# Patient Record
Sex: MALE | Race: WHITE
[De-identification: names, ages, dates, MRNs, and addresses within clinical notes are randomized per-mention and may not be internally consistent; named-entity substitution may affect disease eponyms.]

---

## 2019-06-04 ENCOUNTER — HOSPITAL ENCOUNTER (INPATIENT)
Dept: HOSPITAL 96 - M.ERS | Age: 69
LOS: 3 days | Discharge: HOME | DRG: 444 | End: 2019-06-07
Attending: FAMILY MEDICINE | Admitting: FAMILY MEDICINE
Payer: MEDICARE

## 2019-06-04 VITALS — HEIGHT: 67.99 IN | WEIGHT: 192 LBS | BODY MASS INDEX: 29.1 KG/M2

## 2019-06-04 VITALS — SYSTOLIC BLOOD PRESSURE: 160 MMHG | DIASTOLIC BLOOD PRESSURE: 68 MMHG

## 2019-06-04 DIAGNOSIS — N17.0: ICD-10-CM

## 2019-06-04 DIAGNOSIS — I13.0: ICD-10-CM

## 2019-06-04 DIAGNOSIS — Z98.49: ICD-10-CM

## 2019-06-04 DIAGNOSIS — K80.00: Primary | ICD-10-CM

## 2019-06-04 DIAGNOSIS — Z86.711: ICD-10-CM

## 2019-06-04 DIAGNOSIS — E78.5: ICD-10-CM

## 2019-06-04 DIAGNOSIS — E86.9: ICD-10-CM

## 2019-06-04 DIAGNOSIS — N40.0: ICD-10-CM

## 2019-06-04 DIAGNOSIS — Z82.49: ICD-10-CM

## 2019-06-04 DIAGNOSIS — Z95.1: ICD-10-CM

## 2019-06-04 DIAGNOSIS — Z79.899: ICD-10-CM

## 2019-06-04 DIAGNOSIS — Z95.2: ICD-10-CM

## 2019-06-04 DIAGNOSIS — Z90.5: ICD-10-CM

## 2019-06-04 DIAGNOSIS — I48.91: ICD-10-CM

## 2019-06-04 DIAGNOSIS — Z79.82: ICD-10-CM

## 2019-06-04 DIAGNOSIS — N18.4: ICD-10-CM

## 2019-06-04 DIAGNOSIS — R65.10: ICD-10-CM

## 2019-06-04 DIAGNOSIS — I25.10: ICD-10-CM

## 2019-06-04 DIAGNOSIS — I34.0: ICD-10-CM

## 2019-06-04 DIAGNOSIS — Z79.01: ICD-10-CM

## 2019-06-04 DIAGNOSIS — Z87.891: ICD-10-CM

## 2019-06-04 LAB
ABSOLUTE BASOPHILS: 0 THOU/UL (ref 0–0.2)
ABSOLUTE EOSINOPHILS: 0.1 THOU/UL (ref 0–0.7)
ABSOLUTE MONOCYTES: 0.8 THOU/UL (ref 0–1.2)
ALBUMIN SERPL-MCNC: 3.4 G/DL (ref 3.4–5)
ALP SERPL-CCNC: 57 U/L (ref 46–116)
ALT SERPL-CCNC: 41 U/L (ref 30–65)
ANION GAP SERPL CALC-SCNC: 8 MMOL/L (ref 7–16)
AST SERPL-CCNC: 47 U/L (ref 15–37)
BASOPHILS NFR BLD AUTO: 0.3 %
BILIRUB SERPL-MCNC: 1.4 MG/DL
BUN SERPL-MCNC: 51 MG/DL (ref 7–18)
CALCIUM SERPL-MCNC: 8.7 MG/DL (ref 8.5–10.1)
CHLORIDE SERPL-SCNC: 107 MMOL/L (ref 98–107)
CO2 SERPL-SCNC: 27 MMOL/L (ref 21–32)
CREAT SERPL-MCNC: 2.6 MG/DL (ref 0.6–1.3)
EOSINOPHIL NFR BLD: 1 %
GLUCOSE SERPL-MCNC: 121 MG/DL (ref 70–99)
GRANULOCYTES NFR BLD MANUAL: 83.1 %
HCT VFR BLD CALC: 38.6 % (ref 42–52)
HGB BLD-MCNC: 12.7 GM/DL (ref 14–18)
INR PPP: 3
LIPASE: 155 U/L (ref 73–393)
LYMPHOCYTES # BLD: 1.2 THOU/UL (ref 0.8–5.3)
LYMPHOCYTES NFR BLD AUTO: 9.3 %
MCH RBC QN AUTO: 31 PG (ref 26–34)
MCHC RBC AUTO-ENTMCNC: 33 G/DL (ref 28–37)
MCV RBC: 93.8 FL (ref 80–100)
MONOCYTES NFR BLD: 6.3 %
MPV: 9.8 FL. (ref 7.2–11.1)
NEUTROPHILS # BLD: 11.2 THOU/UL (ref 1.6–8.1)
NT-PRO BRAIN NAT PEPTIDE: 4260 PG/ML (ref ?–300)
NUCLEATED RBCS: 0 /100WBC
PLATELET COUNT*: 193 THOU/UL (ref 150–400)
POTASSIUM SERPL-SCNC: 4.4 MMOL/L (ref 3.5–5.1)
PROT SERPL-MCNC: 7 G/DL (ref 6.4–8.2)
PROTHROMBIN TIME: 30.1 SECONDS (ref 9.2–11.5)
RBC # BLD AUTO: 4.11 MIL/UL (ref 4.5–6)
RDW-CV: 16.2 % (ref 10.5–14.5)
SODIUM SERPL-SCNC: 142 MMOL/L (ref 136–145)
TROPONIN-I LEVEL: <0.06 NG/ML (ref ?–0.06)
WBC # BLD AUTO: 13.4 THOU/UL (ref 4–11)

## 2019-06-04 NOTE — CON
[address]  [address]                    CONSULTATION                  
_______________________________________________________________________________
 
Name:       LIN GARCIA            Room:           [address] [place]  
..#:  [id number]      Account #:      [id number]  
Admission:  06/05/19     Attend Phys:    Sameer Buenrostro, 
Discharge:               Date of Birth:  [birth year]  
         Report #: [id number]
                                                                     [id number]  
_______________________________________________________________________________
THIS REPORT FOR:  //name//                      
 
CC: Nikita Buenrostro
 
DATE OF SERVICE:  06/06/2019
 
 
NEPHROLOGY CONSULTATION:
 
CONSULTING PHYSICIAN:  Dr. Catherine.
 
REASON FOR NEPHROLOGY CONSULTATION:  Acute kidney injury on top of chronic
kidney disease stage 4.
 
REASON FOR ADMISSION:  Abdominal pain and nausea and vomiting.
 
HISTORY OF PRESENT ILLNESS:  This is a 69-year-old male with past medical
history of chronic kidney disease stage 4 with baseline creatinine around 2.3,
history of left nephrectomy in 01/2018 because of bleeding and history of
partial right nephrectomy and this was in 2010 because of renal cell carcinoma
and other history including coronary artery bypass 1 vessel bypass in 2016 with
a mitral valve replacement, mechanical valve replacement and other medical
histories came in because of abdominal pain and nausea and vomiting.  His
creatinine was found to be 2.6 and he was given IV fluids with which his
creatinine has come down to 2.3.  His symptoms have also improved this morning. 
There was initial suspicion of acute cholecystitis, but only gallbladder stones
were found on imaging and there was no evidence of cholecystitis and since then
the symptoms have improved.  There is no surgery planned.  He does not take any
NSAIDs at home.  No history of any kidney stones.
 
ALLERGIES:  No known allergies.
 
REVIEW OF SYSTEMS:  As mentioned in history of present illness, otherwise
negative.
 
PAST MEDICAL HISTORY:  Which include as mentioned in history of present illness.
 Otherwise, shoulder surgery, knee surgery, nose surgery, cataract extraction,
removal of tumor from the right kidney and chronic kidney disease stage 4,
hypertension, hyperlipidemia.
 
PAST SURGICAL HISTORY:  As above.
 
MEDICATIONS:  Include aspirin, escitalopram, Lipitor, warfarin, oxycodone,
amlodipine, carvedilol.
 
 
 
 
[place] [address]                    CONSULTATION                  
_______________________________________________________________________________
 
Name:       LIN GARCIA            Room:           [address] [place]  
[facility].#:  [id number]      Account #:      [id number]  
Admission:  06/05/19     Attend Phys:    Sameer Buenrostro, 
Discharge:               Date of Birth:  [birth year]  
         Report #: [id number]
                                                                     [id number]  
_______________________________________________________________________________
FAMILY HISTORY:  Positive for heart disease.
 
SOCIAL HISTORY:  He is , lives with his wife.  Does not smoke or take
alcohol or use illicit drugs.
 
PHYSICAL EXAMINATION:
VITAL SIGNS:  Blood pressure is 129/70, pulse rate is 81, temperature is 36.9,
respiratory rate is 16 and pulse ox is 98%.  He is on room air.
GENERAL:  He is awake and alert and oriented x 3.
HEAD AND EYES:  Mucous membranes are moist.
NECK:  There is no JVD.
CHEST:  Bilaterally clear to auscultation.  Anteriorly, no crackles or wheezing.
CARDIOVASCULAR:  S1, S2 normal.  No murmurs.
ABDOMEN:  Soft, nontender, nondistended, bowel sounds present.
EXTREMITIES:  There is no lower extremity edema, symmetrical lower extremities.
NEUROLOGICAL:  Gross neurological function is intact.
PSYCHIATRIC:  Mood and affect seem to be normal.
 
LABORATORY DATA:  Hemoglobin 11.6.  WBC is 12.  INR was 3.9, creatinine was 2.3,
down from 2.6.  Other labs are reviewed.
 
IMAGING:  Abdominal ultrasound, chest x-ray and abdominal pelvic CT were
reviewed.
 
ASSESSMENT:
1.  Acute kidney injury, chronic kidney disease stage 4 in the setting of volume
depletion, creatinine was 2.6 at baseline and UA showed 2+ protein, 0-2 rbc's
per high power field and creatinine is 2.3 at baseline.
2.  History of left nephrectomy because of bleeding.  This was in January of
last year and history of right partial nephrectomy.  This was in 2010.
3.  The patient follows with Urology.  He does have a right complex cyst that he
knows of, imaging at our facility here also showed a right complex cyst.
4.  Abdominal pain, nausea, vomiting, gallbladder stones, symptoms have
improved.
5.  Supratherapeutic INR, primary is managing.
6.  History of coronary artery disease and coronary artery bypass.
7.  He is on anticoagulation for history of Factor 5 Leiden as well as for a
mechanical mitral valve.
 
PLAN:
1.  Creatinine has come back to baseline 2.3.  The patient can follow up with
his outpatient nephrologist.
2.  The patient can follow with his urologist for the complex cyst in the right
kidney, which he is already doing.
 
Thank you for this consultation.  I do not have any further recommendations.  I
 
 
 
[place] [address]                    CONSULTATION                  
_______________________________________________________________________________
 
Name:       LIN GARCIA            Room:           [address] [place]  
[facility].#:  [id number]      Account #:      [id number]  
Admission:  06/05/19     Attend Phys:    Sameer Buenrostro, 
Discharge:               Date of Birth:  [birth year]  
         Report #: [id number]
                                                                     [id number]  
_______________________________________________________________________________
will be signing off.  It is okay to be discharged from renal standpoint today. 
Discussed with the family, the patient, and the patient's nurse.
 
 
 
 
 
 
 
 
 
 
 
 
 
 
 
 
 
 
 
 
 
 
 
 
 
 
 
 
 
 
 
 
 
 
 
 
 
 
 
 
 
 
                       
                                        By:                                
                 
D: 06/06/19 1023_______________________________________
T: 06/07/19 0127Ajordan Mendez MD               /nt

## 2019-06-05 VITALS — DIASTOLIC BLOOD PRESSURE: 58 MMHG | SYSTOLIC BLOOD PRESSURE: 115 MMHG

## 2019-06-05 VITALS — DIASTOLIC BLOOD PRESSURE: 70 MMHG | SYSTOLIC BLOOD PRESSURE: 133 MMHG

## 2019-06-05 VITALS — DIASTOLIC BLOOD PRESSURE: 72 MMHG | SYSTOLIC BLOOD PRESSURE: 118 MMHG

## 2019-06-05 LAB
ANION GAP SERPL CALC-SCNC: 8 MMOL/L (ref 7–16)
BILIRUB UR-MCNC: (no result) MG/DL
BUN SERPL-MCNC: 45 MG/DL (ref 7–18)
CALCIUM SERPL-MCNC: 8.6 MG/DL (ref 8.5–10.1)
CHLORIDE SERPL-SCNC: 107 MMOL/L (ref 98–107)
CO2 SERPL-SCNC: 27 MMOL/L (ref 21–32)
COLOR UR: YELLOW
CREAT SERPL-MCNC: 2.3 MG/DL (ref 0.6–1.3)
GLUCOSE SERPL-MCNC: 143 MG/DL (ref 70–99)
HYALINE CASTS #/AREA URNS LPF: (no result) /LPF
KETONES UR STRIP-MCNC: NEGATIVE MG/DL
MAGNESIUM SERPL-MCNC: 2 MG/DL (ref 1.8–2.4)
POTASSIUM SERPL-SCNC: 4.3 MMOL/L (ref 3.5–5.1)
PROT UR QL STRIP: (no result)
RBC # UR STRIP: (no result) /UL
RBC #/AREA URNS HPF: (no result) /HPF (ref 0–2)
SODIUM SERPL-SCNC: 142 MMOL/L (ref 136–145)
SP GR UR STRIP: 1.02 (ref 1–1.03)
SQUAMOUS: (no result) /LPF (ref 0–3)
URINE CLARITY: CLEAR
URINE GLUCOSE-RANDOM: NEGATIVE
URINE LEUKOCYTES-REFLEX: NEGATIVE
URINE NITRITE-REFLEX: NEGATIVE
UROBILINOGEN UR STRIP-ACNC: 0.2 E.U./DL (ref 0.2–1)

## 2019-06-05 NOTE — EKG
[place] [address]
Phone:  [phone]                     ELECTROCARDIOGRAM REPORT      
_______________________________________________________________________________
 
Name:       LIN GARCIA            Room:           [place] [address]   ADM [place]  
[facility]#:  [id number]      Account #:      [id number]  
Admission:  19     Attend Phys:    Sameer Buenrostro, 
Discharge:               Date of Birth:  [birth year]  
         Report #: [id number]
    [id number]
_______________________________________________________________________________
THIS REPORT FOR:  //name//                      
 
                         [facility] ED
                                       
Test Date:    2019               Test Time:    21:36:28
Pat Name:     LIN GARCIA        Department:   
Patient ID:   [id number]            Room:         [facility]
Gender:       M                        Technician:   KS
:          [id number]               Requested By: Marcy Catherine
Order Number: [id number] MD:   Tyler Zamora
                                 Measurements
Intervals                              Axis          
Rate:         74                       P:            -19
MI:           151                      QRS:          -20
QRSD:         106                      T:            82
QT:           419                                    
QTc:          465                                    
                           Interpretive Statements
Sinus rhythm
Borderline left axis deviation
septal infarct, old
Nonspecific repol abnormality, lateral leads
Compared to ECG 10/03/2016 12:28:57
 
Myocardial infarct finding still present
 
Electronically Signed On 2019 10:24:23 CDT by Tylre Zamora
[email or web address]
 
 
 
 
 
 
 
 
 
 
 
 
 
 
 
  <ELECTRONICALLY SIGNED>
                                           By: Tyler Zamora MD, FACC      
  19     1024
D: 19 2136   _____________________________________
T: 196   Tyler Zamora MD, [facility]        /EPI

## 2019-06-05 NOTE — NUR
er admit to  201
telephone report given prior to arrival
patient to  via cart
oriented to rm and call light
denies pain at this time

## 2019-06-06 VITALS — SYSTOLIC BLOOD PRESSURE: 130 MMHG | DIASTOLIC BLOOD PRESSURE: 68 MMHG

## 2019-06-06 VITALS — DIASTOLIC BLOOD PRESSURE: 80 MMHG | SYSTOLIC BLOOD PRESSURE: 149 MMHG

## 2019-06-06 VITALS — DIASTOLIC BLOOD PRESSURE: 70 MMHG | SYSTOLIC BLOOD PRESSURE: 129 MMHG

## 2019-06-06 VITALS — DIASTOLIC BLOOD PRESSURE: 69 MMHG | SYSTOLIC BLOOD PRESSURE: 120 MMHG

## 2019-06-06 VITALS — SYSTOLIC BLOOD PRESSURE: 129 MMHG | DIASTOLIC BLOOD PRESSURE: 68 MMHG

## 2019-06-06 VITALS — SYSTOLIC BLOOD PRESSURE: 107 MMHG | DIASTOLIC BLOOD PRESSURE: 63 MMHG

## 2019-06-06 LAB
ABSOLUTE MONOCYTES: 0.2 THOU/UL (ref 0–1.2)
ALBUMIN SERPL-MCNC: 2.5 G/DL (ref 3.4–5)
ALP SERPL-CCNC: 75 U/L (ref 46–116)
ALT SERPL-CCNC: 170 U/L (ref 30–65)
ANION GAP SERPL CALC-SCNC: 8 MMOL/L (ref 7–16)
AST SERPL-CCNC: 131 U/L (ref 15–37)
BILIRUB SERPL-MCNC: 3 MG/DL
BUN SERPL-MCNC: 35 MG/DL (ref 7–18)
CALCIUM SERPL-MCNC: 8 MG/DL (ref 8.5–10.1)
CHLORIDE SERPL-SCNC: 107 MMOL/L (ref 98–107)
CO2 SERPL-SCNC: 23 MMOL/L (ref 21–32)
CREAT SERPL-MCNC: 2.1 MG/DL (ref 0.6–1.3)
GLUCOSE SERPL-MCNC: 135 MG/DL (ref 70–99)
GRANULOCYTES NFR BLD MANUAL: 88 %
HCT VFR BLD CALC: 33.3 % (ref 42–52)
HGB BLD-MCNC: 11.3 GM/DL (ref 14–18)
INR PPP: 3.9
LYMPHOCYTES # BLD: 1.2 THOU/UL (ref 0.8–5.3)
LYMPHOCYTES NFR BLD AUTO: 6 %
MCH RBC QN AUTO: 31.9 PG (ref 26–34)
MCHC RBC AUTO-ENTMCNC: 33.8 G/DL (ref 28–37)
MCV RBC: 94.3 FL (ref 80–100)
MONOCYTES NFR BLD: 2 %
MPV: 10.1 FL. (ref 7.2–11.1)
NEUTROPHILS # BLD: 10.6 THOU/UL (ref 1.6–8.1)
NUCLEATED RBCS: 0 /100WBC
PLATELET # BLD EST: ADEQUATE 10*3/UL
PLATELET COUNT*: 137 THOU/UL (ref 150–400)
POTASSIUM SERPL-SCNC: 4 MMOL/L (ref 3.5–5.1)
PROT SERPL-MCNC: 6 G/DL (ref 6.4–8.2)
PROTHROMBIN TIME: 39.1 SECONDS (ref 9.2–11.5)
RBC # BLD AUTO: 3.53 MIL/UL (ref 4.5–6)
RBC MORPH BLD: NORMAL
RDW-CV: 16.6 % (ref 10.5–14.5)
SODIUM SERPL-SCNC: 138 MMOL/L (ref 136–145)
VARIANT LYMPHS NFR BLD MANUAL: 4 %
WBC # BLD AUTO: 12 THOU/UL (ref 4–11)

## 2019-06-06 NOTE — NUR
Pt is A&O. Resides at home with his wife. Independent. No DME. Hx of [facility] and
[facility]. Hx of inpt rehab at [facility]. No hx of SNF. Hx of
cardiac rehab. Goal is home at dc, no needs anticipated.

## 2019-06-06 NOTE — NUR
WOUND NURSE:  PATIENT SEEN TO ADDRESS HEALING SKIN TEAR ON THE LEFT FOREARM.
EDGES ARE WELL APPROXIMATED AND THERE IS NO ACTIVE DRAINAGE.  MEASURES 2.0 X
0.2 X 0.1 CM.  CLEANSED WITH WOUND CLEANSER AND GAUZE, THEN APPLIED MARATHON
LIQUID SKIN PROTECTANT.  THIS WAS TOLERATED WELL BY THE PATIENT.  DO NOT
ANTICIPATE FURTHER FOLLOW UP WILL BE NECESSARY. PATIENT INSTRUCTED ON
PREVENTATIVE MEASURES TO PREVENT FURTHER SKIN TRAUMA IN THE FUTURE.

## 2019-06-06 NOTE — NUR
ASSUMED PT CARE AT APPROX 1930. PT IS AWAKE AND ORIENTED X4. VSS ON ROOM AIR.
SR ON TELE. PT COMPLAINED OF RUQ ABDOMINAL PAIN RELIEVED BY MORPHINE IV GIVEN
PER MAR. PT IS ABLE TO SLEEP MOST OF THE NIGHT. CALL LIGHT WITHIN REACH.
HOURLY RUNDING DONE FOR PT SAFETY.

## 2019-06-06 NOTE — CON
[address]  [address]                    CONSULTATION                  
_______________________________________________________________________________
 
Name:       LIN GARCIA            Room:           [address] [place]  
[facility].#:  [id number]      Account #:      [id number]  
Admission:  06/05/19     Attend Phys:    Sameer Buenrostro, 
Discharge:               Date of Birth:  [birth year]  
         Report #: [id number]
                                                                     [id number]  
_______________________________________________________________________________
THIS REPORT FOR:  //name//                      
 
CC: Nikita Buenrostro
 
DATE OF SERVICE:  06/05/2019
 
 
HISTORY OF PRESENT ILLNESS:  The patient is a 69-year-old  white male who
I was asked to see in the Emergency Room today because of his history of
coronary artery disease.  The patient has extensive past medical history. 
Unfortunately, none of the old records available.  He has a long history of
hypertension and hyperlipidemia.  He has had multiple heart catheterizations in
the past because of chest pain, but was never found to have coronary artery
disease previously.  He has a long history of heart murmur and was previously
followed by Dr. San at [facility] for mitral regurgitation. 
Apparently, his mitral regurgitation worsened and the patient eventually
underwent mitral valve replacement using a mechanical mitral valve and a single
vessel coronary artery bypass surgery at [facility] in 09/2016. 
He had a previous history of bilateral PEs and has been on warfarin for about 12
years because of a history of factor V Leiden.  He then notes that last year, he
was at [facility] for almost 2 months.  He apparently had left
flank pain and there was some type of hemorrhage of his kidney.  He eventually
had to have a kidney removed and required a transfusion.  Apparently,
postoperatively, he had atrial fibrillation, was on sotalol for a period of
time, which was discontinued because of low blood pressure.  He has been on
midodrine in the past.  Recently, however, the patient was doing well, although
he does not exercise on a regular basis.  Recently, he has had intermittent
right upper quadrant pain.  Last night, after dinner, he had pain in his right
upper quadrant, became nauseated, vomited 4 times.  He came to the Emergency
Room and was felt to have cholecystitis.  Cardiology consultation was requested.
 He denied recent chest pain, increased shortness of breath.  He does note
occasional irregular heartbeat, but he has had no syncope or edema.
 
PAST MEDICAL HISTORY:  Otherwise, he has had shoulder surgery, knee surgery,
nose surgery, cataract extraction, removal of a tumor from the right kidney.
 
MEDICATIONS:  Consist of aspirin, escitalopram, Lipitor, warfarin, oxycodone,
amlodipine, carvedilol.
 
ALLERGIES:  He has no known drug allergies.
 
FAMILY HISTORY:  Positive for heart disease.
 
SOCIAL HISTORY:  He is .  He and his wife live in [place], [place].  He
 
 
 
[place] [address]                    CONSULTATION                  
_______________________________________________________________________________
 
Name:       LIN GARCIA            Room:           [address] [place]  
[facility]#:  [id number]      Account #:      [id number]  
Admission:  06/05/19     Attend Phys:    Sameer Buenrostro, 
Discharge:               Date of Birth:  [birth year]  
         Report #: [id number]
                                                                     [id number]  
_______________________________________________________________________________
is a retired .  He quit smoking a year ago.  No alcohol abuse.
 
REVIEW OF SYSTEMS:  He has had no history of stroke, asthma, peptic ulcer
disease, liver disease, psychiatric illness or chronic skin condition.
 
PHYSICAL EXAMINATION:
GENERAL:  an elderly male, lying in bed, appeared in no acute distress.
VITAL SIGNS:  He had a blood pressure of 130/70, pulse 70, he was afebrile.
HEENT:  He was anicteric.  Conjunctivae pink.  Mucous members appeared moist.
NECK:  Veins did not appear distended.  No carotid bruits.
CHEST:  Revealed crackles in the bases.
CARDIAC:  Regular rate and rhythm, metallic mitral opening and closing sound. 
No significant murmurs.
ABDOMEN:  Soft.
EXTREMITIES:  Had no edema.  Dorsalis pedis pulse 1+ bilaterally.
SKIN:  Cool and dry.
NEUROLOGIC:  Nonfocal.
 
LABORATORY DATA:  His ECG showed a sinus rhythm, nonspecific ST-segment changes.
 
His workup in the Emergency Room last night, he had a CT scan of the abdomen and
pelvis with contrast that showed evidence of previous left nephrectomy, partial
right renal resection.  There are multiple gallstones, no biliary obstruction,
coronary artery calcifications noted.  His chest x-ray last night showed
cardiomegaly, evidence of previous sternotomy.  He had a liver ultrasound today
that showed a cyst of the right kidney.
 
His lab work, sodium 142, BUN 51, creatinine 2.6, glucose 121.  Troponin 0.06. 
BNP 4260.  INR is 3.0.  White blood cell count 13.4, hemoglobin 12.6.
 
IMPRESSION AND RECOMMENDATIONS:
1.  Status post mitral valve replacement.  I will attempt to obtain old records.
 I would continue chronic anticoagulation.
2.  History of atrial fibrillation.  The patient is no longer on sotalol.
3.  Coronary artery disease, previous bypass surgery.  No recent angina.
4.  Previous removal of renal cell carcinoma.
5.  History of factor V Leiden.  The patient has had previous pulmonary embolus.
6.  History of hypertension.  The patient has been on calcium blocker and beta
blocker.
7.  Hyperlipidemia.  The patient is on a statin drug.
8.  History of hypotension.  The patient has been on midodrine in the past.
 
 
 
[place] [address]                    CONSULTATION                  
_______________________________________________________________________________
 
Name:       LIN GARCIA            Room:           [address] [place]  
[facility].#:  [id number]      Account #:      [id number]  
Admission:  06/05/19     Attend Phys:    Sameer Buenrostro, 
Discharge:               Date of Birth:  [birth year]  
         Report #: [id number]
                                                                     [id number]  
_______________________________________________________________________________
9.  Acute cholecystitis.  I would recommend no procedure until his INR is less
than 1.6.
 
 
 
 
 
 
 
 
 
 
 
 
 
 
 
 
 
 
 
 
 
 
 
 
 
 
 
 
 
 
 
 
 
 
 
 
 
 
 
 
 
 
<ELECTRONICALLY SIGNED>
                                        By:  Tyler Zamora MD, FACC      
06/06/19     1401
D: 06/05/19 1125_______________________________________
T: 06/06/19 0415Davigino Zamora MD, FACYOLANDA         /nt

## 2019-06-07 VITALS — DIASTOLIC BLOOD PRESSURE: 61 MMHG | SYSTOLIC BLOOD PRESSURE: 145 MMHG

## 2019-06-07 VITALS — DIASTOLIC BLOOD PRESSURE: 79 MMHG | SYSTOLIC BLOOD PRESSURE: 139 MMHG

## 2019-06-07 VITALS — SYSTOLIC BLOOD PRESSURE: 139 MMHG | DIASTOLIC BLOOD PRESSURE: 79 MMHG

## 2019-06-07 VITALS — DIASTOLIC BLOOD PRESSURE: 69 MMHG | SYSTOLIC BLOOD PRESSURE: 140 MMHG

## 2019-06-07 LAB
INR PPP: 2.8
PROTHROMBIN TIME: 28.1 SECONDS (ref 9.2–11.5)

## 2019-06-07 NOTE — NUR
ASSUMED PATIENT CARE AT 1900.  PATIENT ALERT AND ORIENTED TIMES FOUR.  MINOR
COMPLAINTS OF PAIN NOTED, CONTROLLED WITH IV PAIN MEDICATION.  RN ASSESSMENT
AND HOURLY ROUNDING COMPLETED AS CHARTED